# Patient Record
Sex: MALE | Race: WHITE | NOT HISPANIC OR LATINO | Employment: PART TIME | URBAN - METROPOLITAN AREA
[De-identification: names, ages, dates, MRNs, and addresses within clinical notes are randomized per-mention and may not be internally consistent; named-entity substitution may affect disease eponyms.]

---

## 2017-11-02 ENCOUNTER — ALLSCRIPTS OFFICE VISIT (OUTPATIENT)
Dept: OTHER | Facility: OTHER | Age: 27
End: 2017-11-02

## 2017-11-02 LAB — S PYO AG THROAT QL: POSITIVE

## 2017-11-03 NOTE — PROGRESS NOTES
Assessment  1  Strep pharyngitis (034 0) (J02 0)    Plan  Strep pharyngitis    · Azithromycin 250 MG Oral Tablet; TAKE 2 TABLETS ON DAY 1 THEN TAKE 1  TABLET A DAY FOR 4 DAYS    Discussion/Summary    ADD ZPAK  HYDRATEIF NO BETTER  The patient was counseled regarding diagnostic results,-- instructions for management,-- risk factor reductions,-- prognosis,-- patient and family education,-- impressions,-- risks and benefits of treatment options,-- importance of compliance with treatment  Chief Complaint  1  Sore Throat  SORE THROAT      History of Present Illness  HPI: 3 DAYS OF SORE THROAT; HE IS POS FOR STREP   Sore Throat: Parmjit Wen presents with complaints of sore throat  Associated symptoms include nasal congestion,-- postnasal drainage,-- headache,-- cough-- and-- fatigue, but-- no dysphagia,-- no odynophagia,-- no swollen glands,-- no myalgias,-- no drooling,-- no stridor,-- no chills,-- no hoarseness,-- no neck stiffness,-- no ear pain,-- no facial pain,-- no abdominal pain,-- no nausea,-- no vomiting,-- no rash-- and-- no anorexia  Review of Systems    Constitutional: feeling poorly-- and-- feeling tired, but-- as noted in HPI    ENT: sore throat-- and-- nasal discharge, but-- as noted in HPI,-- no earache,-- no hearing loss-- and-- no hoarseness  Cardiovascular: no complaints of slow or fast heart rate, no chest pain, no palpitations, no leg claudication or lower extremity edema  Respiratory: cough-- and-- SL COUGH, but-- no complaints of shortness of breath, no wheezing or cough, no dyspnea on exertion, no orthopnea or PND  Gastrointestinal: no complaints of abdominal pain, no constipation, no nausea or vomiting, no diarrhea or bloody stools  Genitourinary: no complaints of dysuria or incontinence, no hesitancy, no nocturia, no genital lesion, no inadequacy of penile erection     Musculoskeletal: no complaints of arthralgia, no myalgia, no joint swelling or stiffness, no limb pain or swelling  Integumentary: no complaints of skin rash or lesion, no itching or dry skin, no skin wounds  Neurological: no complaints of headache, no confusion, no numbness or tingling, no dizziness or fainting  ROS reviewed  Active Problems  1  Porokeratosis (787 39)    Past Medical History  Active Problems And Past Medical History Reviewed: The active problems and past medical history were reviewed and updated today  Surgical History  Surgical History Reviewed: The surgical history was reviewed and updated today  Social History   · Never A Smoker  The social history was reviewed and updated today  The social history was reviewed and is unchanged  Family History  Family History Reviewed: The family history was reviewed and updated today  Current Meds   1  No Reported Medications  Requested for: [id number] Recorded    The medication list was reviewed and updated today  Allergies  1  No Known Drug Allergies    Vitals   Recorded: [id number] 11:33AM   Temperature 97 5 F   Heart Rate 80   Respiration 16   Systolic 866   Diastolic 72   Height 5 ft 10 in   Weight 168 lb    BMI Calculated 24 11   BSA Calculated 1 94     Physical Exam    Constitutional   General appearance: No acute distress, well appearing and well nourished  -- WDWN MALE  Eyes   Conjunctiva and lids: No swelling, erythema, or discharge  Pupils and irises: Equal, round and reactive to light  Ears, Nose, Mouth, and Throat   External inspection of ears and nose: Normal     Otoscopic examination: Tympanic membrance translucent with normal light reflex  Canals patent without erythema  Nasal mucosa, septum, and turbinates: Abnormal  -- CLEAR NASAL D/C  Oropharynx: Abnormal  -- ERYTHEMA WITH EXUDATE ON LEFT SIDE;  Pulmonary   Respiratory effort: No increased work of breathing or signs of respiratory distress      Auscultation of lungs: Clear to auscultation, equal breath sounds bilaterally, no wheezes, no rales, no rhonci  Cardiovascular   Palpation of heart: Normal PMI, no thrills  Auscultation of heart: Normal rate and rhythm, normal S1 and S2, without murmurs  Lymphatic   Palpation of lymph nodes in neck: Abnormal  -- SHODDY NODES LEFT SIDE  Signatures   Electronically signed by :  Marisa Duff DO; Nov 2 2017 11:52AM EST                       (Author)

## 2018-01-12 VITALS
HEART RATE: 80 BPM | WEIGHT: 168 LBS | DIASTOLIC BLOOD PRESSURE: 72 MMHG | RESPIRATION RATE: 16 BRPM | BODY MASS INDEX: 24.05 KG/M2 | HEIGHT: 70 IN | TEMPERATURE: 97.5 F | SYSTOLIC BLOOD PRESSURE: 110 MMHG

## 2018-01-17 NOTE — MISCELLANEOUS
Message  Return to work or school:   Deny Sharpe is under my professional care  He was seen in my office on 11/02/2017             Signatures   Electronically signed by :  Marisa Duff DO; Nov 2 2017 12:48PM EST                       (Author)

## 2018-01-18 NOTE — MISCELLANEOUS
Message  Return to work or school:   Oral [place] is under my professional care  He was seen in my office on 2/24/16   He is able to return to work on  2/25/16      Dx: fatigue, pharyngitis          Signatures   Electronically signed by : Sanjana Wetzel, [facility]; Feb 24 2016  5:35PM EST                       (Author)

## 2018-01-18 NOTE — PROGRESS NOTES
Assessment    1  Fatigue (780 79) (R53 83)   2  Pharyngitis (462) (J02 9)    Plan  Fatigue    · (1) ADONAY BARR VIRUS; Status:Active; Requested for:[id number];    · (LC) EBV Acute Infection Antibodies; Status:Active; Requested for:[id number];   Pharyngitis    · Fluticasone Propionate 50 MCG/ACT Nasal Suspension; USE 2 SPRAYS IN EACH  NOSTRIL ONCE DAILY   · Mucinex D  MG Oral Tablet Extended Release 12 Hour; TAKE 1 TABLET  EVERY 12 HOURS AS NEEDED   · PredniSONE 20 MG Oral Tablet; take 2 tablet daily   · Follow-up PRN Evaluation and Treatment  Follow-up  Status: Complete  Done:  [id number]   · Gargle with warm salt water for 5 minutes every 4 hours ; Status:Complete;   Done:  [id number]     will order ebv panel, hold off on antibiotics until return, send out throat culture and follow up     Discussion/Summary  Discussion Summary:   Feels like when he had mono several years ago  will order mono spot and have results sent to PCP  Chief Complaint    1  Sore Throat  Chief Complaint Free Text Note Form: Pt states- sore throat and feeling fatigued for several days      History of Present Illness  HPI: pt had mono several years ago, now c/o increased fatigue, almost exhaustion, and sore throat  would like to be checked for mono  explained i can order blood work but will have to follow with dr Ananya Logan for follow up  sore throat x 1 day mild fever   Hospital Based Practices Required Assessment:   Pain Assessment   the patient states they have pain  The pain is located in the throat  (on a scale of 0 to 10, the patient rates the pain at 3 )   Abuse And Domestic Violence Screen    Yes, the patient is safe at home  The patient states no one is hurting them  Depression And Suicide Screen  No, the patient has not had thoughts of hurting themself  No, the patient has not felt depressed in the past 7 days  Prefered Language is  english  Primary Language is  english  Readiness To Learn: Receptive     Barriers To Learning: none  Preferred Learning: verbal   Sore Throat: Anton Aburto presents with complaints of sore throat  Fatigue: The patient is being seen for an initial evaluation of fatigue  Symptoms:  fatigue  The patient is currently experiencing symptoms  Associated symptoms:  somnolence and swollen glands  Review of Systems  Focused-Male:   Constitutional: fever, chills and feeling tired  ENT: sore throat  Cardiovascular: no complaints of slow or fast heart rate, no chest pain, no palpitations, no leg claudication or lower extremity edema  Respiratory: as noted in HPI  Gastrointestinal: no complaints of abdominal pain, no constipation, no nausea or vomiting, no diarrhea or bloody stools  Active Problems    1  Musculoskeletal chest pain (786 59) (R07 89)   2  Porokeratosis (757 39)    Past Medical History    1  History of infectious mononucleosis (V12 09) (Z86 19)    Social History    · Never A Smoker    Surgical History    1  History of Elbow Surgery    Current Meds   1  No Reported Medications Recorded    Allergies    1  No Known Drug Allergies    Vitals  Signs [Data Includes: Current Encounter]   Recorded: [id number] 03:49PM   Temperature: 96 3 F, Tympanic  Heart Rate: 86  Respiration: 18  Systolic: 346  Diastolic: 78  Weight: 253 lb   O2 Saturation: 98  Pain Scale: 3    Physical Exam    Constitutional   General appearance: No acute distress, well appearing and well nourished  Eyes   Conjunctiva and lids: No swelling, erythema, or discharge  Ears, Nose, Mouth, and Throat   Otoscopic examination: Tympanic membrance translucent with normal light reflex  Canals patent without erythema  very erythematous, no exudate  Pulmonary   Auscultation of lungs: Clear to auscultation  Cardiovascular   Auscultation of heart: Normal rate and rhythm, normal S1 and S2, without murmurs  Abdomen   Abdomen: Non-tender, no masses  Lymphatic   Palpation of lymph nodes in neck: No lymphadenopathy  Message  Return to work or school:   Ursula Spivey is under my professional care  He was seen in my office on 2/24/16   He is able to return to work on  2/25/16      Dx: fatigue, pharyngitis          Signatures   Electronically signed by : Gaby Brock, [facility]; Feb 24 2016  5:35PM EST                       (Author)

## 2018-07-02 ENCOUNTER — APPOINTMENT (OUTPATIENT)
Dept: LAB | Age: 28
End: 2018-07-02

## 2018-07-02 ENCOUNTER — TRANSCRIBE ORDERS (OUTPATIENT)
Dept: ADMINISTRATIVE | Age: 28
End: 2018-07-02

## 2018-07-02 DIAGNOSIS — Z00.8 ENCOUNTER FOR OTHER GENERAL EXAMINATION: Primary | ICD-10-CM

## 2018-07-02 DIAGNOSIS — Z00.8 ENCOUNTER FOR OTHER GENERAL EXAMINATION: ICD-10-CM

## 2018-07-02 LAB
CHOLEST SERPL-MCNC: 169 MG/DL (ref 50–200)
EST. AVERAGE GLUCOSE BLD GHB EST-MCNC: 103 MG/DL
HBA1C MFR BLD: 5.2 % (ref 4.2–6.3)
HDLC SERPL-MCNC: 42 MG/DL (ref 40–60)
LDLC SERPL CALC-MCNC: 75 MG/DL (ref 0–100)
NONHDLC SERPL-MCNC: 127 MG/DL
TRIGL SERPL-MCNC: 258 MG/DL

## 2018-07-02 PROCEDURE — 80061 LIPID PANEL: CPT

## 2018-07-02 PROCEDURE — 83036 HEMOGLOBIN GLYCOSYLATED A1C: CPT

## 2018-07-02 PROCEDURE — 36415 COLL VENOUS BLD VENIPUNCTURE: CPT

## 2019-03-13 ENCOUNTER — OFFICE VISIT (OUTPATIENT)
Dept: FAMILY MEDICINE CLINIC | Facility: CLINIC | Age: 29
End: 2019-03-13
Payer: COMMERCIAL

## 2019-03-13 VITALS
DIASTOLIC BLOOD PRESSURE: 62 MMHG | HEART RATE: 70 BPM | HEIGHT: 70 IN | BODY MASS INDEX: 25.65 KG/M2 | SYSTOLIC BLOOD PRESSURE: 128 MMHG | RESPIRATION RATE: 16 BRPM | WEIGHT: 179.2 LBS | OXYGEN SATURATION: 97 % | TEMPERATURE: 97.5 F

## 2019-03-13 DIAGNOSIS — E66.3 OVERWEIGHT (BMI 25.0-29.9): ICD-10-CM

## 2019-03-13 DIAGNOSIS — Z23 NEED FOR HPV VACCINATION: ICD-10-CM

## 2019-03-13 DIAGNOSIS — Z00.00 ROUTINE ADULT HEALTH MAINTENANCE: Primary | ICD-10-CM

## 2019-03-13 DIAGNOSIS — Z11.1 ENCOUNTER FOR PPD TEST: ICD-10-CM

## 2019-03-13 DIAGNOSIS — Z23 NEED FOR TDAP VACCINATION: ICD-10-CM

## 2019-03-13 PROCEDURE — 90471 IMMUNIZATION ADMIN: CPT

## 2019-03-13 PROCEDURE — 90651 9VHPV VACCINE 2/3 DOSE IM: CPT

## 2019-03-13 PROCEDURE — 90715 TDAP VACCINE 7 YRS/> IM: CPT

## 2019-03-13 PROCEDURE — 86580 TB INTRADERMAL TEST: CPT

## 2019-03-13 PROCEDURE — 99395 PREV VISIT EST AGE 18-39: CPT | Performed by: FAMILY MEDICINE

## 2019-03-13 PROCEDURE — 90472 IMMUNIZATION ADMIN EACH ADD: CPT

## 2019-03-13 NOTE — PATIENT INSTRUCTIONS

## 2019-03-13 NOTE — PROGRESS NOTES
[address] 34 y o  male   DATE: March 13, 2019   Assessment and Plan:  34 y o  male exam      1  Health Maintenance  - Colonoscopy? Not at increased risk, start ate age of 50  - Labs: None  - Immunizations: Reviewed  Flu shot UTD with work  TDaP and HPV#1 today  PPD placed today  Form filled out today-will return when he comes back for PPD check in 2 days  2  Discussed the patient's BMI with him, (Body mass index is 25 71 kg/m²  )  Advised a healthy, balanced diet and regular exercise for 30 minutes 4-5 times a week  BMI Counseling: Body mass index is 25 71 kg/m²  Discussed the patient's BMI with him  The BMI is above average  BMI counseling and education was provided to the patient  Nutrition recommendations include consuming healthier snacks and increasing intake of lean protein  3  Patient Counseling: Patient given handout  --Nutrition: Stressed importance of moderation in sodium/caffeine intake, saturated fat and cholesterol, caloric balance, sufficient intake of fresh fruits, vegetables, fiber, calcium, AND iron  --Exercise: Stressed the importance of regular exercise  --Substance Abuse: Discussed cessation/primary prevention of tobacco, alcohol, or other drug use; driving or other dangerous activities under the influence; availability of treatment for abuse  --Dental health: Discussed importance of regular tooth brushing, flossing, and dental visits  Subjective:    Juan White is a 34 y o  male and is here for his comprehensive physical exam      No acute complaints  Needs a physical for a new job he is looking for a coaching volleyball job at [employer]  He has a form that needs filled out  Currently works in [employer] at [employer]  Sexually active with women, no concern for STDs, declines screening  Histories Updated and Reviewed 3/13/2019:  Patient's Medications    No medications on file     No Known Allergies  History reviewed   No pertinent past medical history  Social History     Socioeconomic History    Marital status: Single     Spouse name: Not on file    Number of children: Not on file    Years of education: Not on file    Highest education level: Not on file   Occupational History    Not on file   Social Needs    Financial resource strain: Not on file    Food insecurity:     Worry: Not on file     Inability: Not on file    Transportation needs:     Medical: Not on file     Non-medical: Not on file   Tobacco Use    Smoking status: Never Smoker    Smokeless tobacco: Never Used   Substance and Sexual Activity    Alcohol use: Yes     Comment: ocasionally    Drug use: Never    Sexual activity: Not on file   Lifestyle    Physical activity:     Days per week: Not on file     Minutes per session: Not on file    Stress: Not on file   Relationships    Social connections:     Talks on phone: Not on file     Gets together: Not on file     Attends Voodoo service: Not on file     Active member of club or organization: Not on file     Attends meetings of clubs or organizations: Not on file     Relationship status: Not on file    Intimate partner violence:     Fear of current or ex partner: Not on file     Emotionally abused: Not on file     Physically abused: Not on file     Forced sexual activity: Not on file   Other Topics Concern    Not on file   Social History Narrative    Not on file     Immunization History   Administered Date(s) Administered    DTaP 5 1992, 01/15/1993, 04/01/1993, 05/03/1994, 10/10/1997    Hep B, adult 1992, 1992, 05/24/1993    Hib (PRP-OMP) 1992, 1992, 10/19/1993    MMR 01/11/1994, 10/10/1997    Meningococcal Polysaccharide (MPSV4) 05/27/2008    OPV 1992, 1992, 05/03/1994, 10/10/1997    Td (adult), adsorbed 07/15/2004    Tdap 06/04/2009    Varicella 10/10/1997, 05/27/2008       Review of Systems:  Review of Systems   Constitutional: Negative for chills and fever     HENT: Negative for ear pain  Eyes: Negative for visual disturbance  Respiratory: Negative for cough and shortness of breath  Cardiovascular: Negative for chest pain and palpitations  Gastrointestinal: Negative for abdominal pain, diarrhea, nausea and vomiting  Musculoskeletal: Negative for arthralgias  Skin: Negative for rash  Neurological: Negative for headaches  Hematological: Does not bruise/bleed easily  PHQ-9 Depression Screening    PHQ-9:    Frequency of the following problems over the past two weeks:       Little interest or pleasure in doing things:  0 - not at all  Feeling down, depressed, or hopeless:  0 - not at all  PHQ-2 Score:  0         Objective:  /62   Pulse 70   Temp 97 5 °F (36 4 °C)   Resp 16   Ht 5' 10" (1 778 m)   Wt 81 3 kg (179 lb 3 2 oz)   SpO2 97%   BMI 25 71 kg/m²   Physical Exam   Constitutional: He is oriented to person, place, and time  He appears well-developed and well-nourished  No distress  HENT:   Head: Normocephalic and atraumatic  Mouth/Throat: Oropharynx is clear and moist  No oropharyngeal exudate  Eyes: Pupils are equal, round, and reactive to light  EOM are normal  Right eye exhibits no discharge  Left eye exhibits no discharge  Neck: Normal range of motion  Neck supple  No JVD present  Cardiovascular: Normal rate, regular rhythm and normal heart sounds  No murmur heard  Pulmonary/Chest: Effort normal and breath sounds normal  No stridor  No respiratory distress  He has no wheezes  Abdominal: Soft  Bowel sounds are normal  There is no tenderness  There is no rebound and no guarding  Musculoskeletal: Normal range of motion  He exhibits no edema or tenderness  Neurological: He is alert and oriented to person, place, and time  Skin: Skin is warm and dry  He is not diaphoretic  No erythema  Psychiatric: He has a normal mood and affect  His behavior is normal    Vitals reviewed  Patient Care Team:  Hali Hernandez MD as PCP - General (Family Medicine)    Britta Snyder MD    Note: Portions of the record may have been created with voice recognition software  Occasional wrong word or "sound a like" substitutions may have occurred due to the inherent limitations of voice recognition software  Read the chart carefully and recognize, using context, where substitutions have occurred

## 2019-03-15 LAB
INDURATION: 0 MM
TB SKIN TEST: NEGATIVE

## 2019-05-23 ENCOUNTER — CLINICAL SUPPORT (OUTPATIENT)
Dept: FAMILY MEDICINE CLINIC | Facility: CLINIC | Age: 29
End: 2019-05-23
Payer: COMMERCIAL

## 2019-05-23 DIAGNOSIS — Z23 NEED FOR PROPHYLACTIC VACCINATION AGAINST HUMAN PAPILLOMAVIRUS (HPV) TYPES 6, 11, 16, AND 18: Primary | ICD-10-CM

## 2019-05-23 PROCEDURE — 90471 IMMUNIZATION ADMIN: CPT | Performed by: FAMILY MEDICINE

## 2019-05-23 PROCEDURE — 90651 9VHPV VACCINE 2/3 DOSE IM: CPT | Performed by: FAMILY MEDICINE
